# Patient Record
Sex: FEMALE | Race: BLACK OR AFRICAN AMERICAN | NOT HISPANIC OR LATINO | Employment: OTHER | URBAN - METROPOLITAN AREA
[De-identification: names, ages, dates, MRNs, and addresses within clinical notes are randomized per-mention and may not be internally consistent; named-entity substitution may affect disease eponyms.]

---

## 2017-12-14 PROBLEM — M79.645 THUMB PAIN, LEFT: Status: ACTIVE | Noted: 2017-12-14

## 2022-12-06 PROBLEM — R29.818 PROGRESSIVE NEUROLOGIC DECLINE: Status: ACTIVE | Noted: 2021-11-28

## 2022-12-06 PROBLEM — R26.9 ABNORMALITY OF GAIT AND MOBILITY: Status: ACTIVE | Noted: 2022-12-06

## 2023-12-07 PROCEDURE — G0180 MD CERTIFICATION HHA PATIENT: HCPCS | Mod: ,,, | Performed by: INTERNAL MEDICINE

## 2024-01-02 ENCOUNTER — OUTPATIENT CASE MANAGEMENT (OUTPATIENT)
Dept: ADMINISTRATIVE | Facility: OTHER | Age: 84
End: 2024-01-02
Payer: MEDICARE
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
## 2024-01-05 ENCOUNTER — CLINICAL SUPPORT (OUTPATIENT)
Dept: FAMILY MEDICINE | Facility: CLINIC | Age: 84
End: 2024-01-05
Payer: MEDICARE

## 2024-01-05 VITALS — WEIGHT: 194.25 LBS | BODY MASS INDEX: 32.32 KG/M2

## 2024-01-05 DIAGNOSIS — Z23 NEED FOR INFLUENZA VACCINATION: Primary | ICD-10-CM

## 2024-01-05 PROCEDURE — G0008 ADMIN INFLUENZA VIRUS VAC: HCPCS | Mod: HCNC,S$GLB,, | Performed by: INTERNAL MEDICINE

## 2024-01-05 PROCEDURE — 90694 VACC AIIV4 NO PRSRV 0.5ML IM: CPT | Mod: HCNC,S$GLB,, | Performed by: INTERNAL MEDICINE

## 2024-01-11 ENCOUNTER — OFFICE VISIT (OUTPATIENT)
Dept: PSYCHIATRY | Facility: CLINIC | Age: 84
End: 2024-01-11
Payer: MEDICARE

## 2024-01-11 VITALS
HEART RATE: 68 BPM | WEIGHT: 196.19 LBS | DIASTOLIC BLOOD PRESSURE: 75 MMHG | OXYGEN SATURATION: 97 % | BODY MASS INDEX: 32.65 KG/M2 | SYSTOLIC BLOOD PRESSURE: 117 MMHG

## 2024-01-11 DIAGNOSIS — F41.1 GAD (GENERALIZED ANXIETY DISORDER): ICD-10-CM

## 2024-01-11 DIAGNOSIS — F33.0 MDD (MAJOR DEPRESSIVE DISORDER), RECURRENT EPISODE, MILD: Primary | ICD-10-CM

## 2024-01-11 DIAGNOSIS — I69.911 MEMORY DEFICIT AFTER CEREBROVASCULAR DISEASE: ICD-10-CM

## 2024-01-11 PROCEDURE — 99999 PR PBB SHADOW E&M-EST. PATIENT-LVL IV: CPT | Mod: PBBFAC,HCNC,,

## 2024-01-11 PROCEDURE — 99215 OFFICE O/P EST HI 40 MIN: CPT | Mod: HCNC,S$GLB,,

## 2024-01-11 PROCEDURE — 3078F DIAST BP <80 MM HG: CPT | Mod: HCNC,CPTII,S$GLB,

## 2024-01-11 PROCEDURE — 3074F SYST BP LT 130 MM HG: CPT | Mod: HCNC,CPTII,S$GLB,

## 2024-01-11 PROCEDURE — 1159F MED LIST DOCD IN RCRD: CPT | Mod: HCNC,CPTII,S$GLB,

## 2024-01-11 PROCEDURE — 1160F RVW MEDS BY RX/DR IN RCRD: CPT | Mod: HCNC,CPTII,S$GLB,

## 2024-01-11 RX ORDER — TRAZODONE HYDROCHLORIDE 150 MG/1
150 TABLET ORAL NIGHTLY
Qty: 90 TABLET | Refills: 1 | Status: SHIPPED | OUTPATIENT
Start: 2024-01-11 | End: 2024-07-09

## 2024-01-11 RX ORDER — SERTRALINE HYDROCHLORIDE 100 MG/1
100 TABLET, FILM COATED ORAL DAILY
Qty: 90 TABLET | Refills: 1 | Status: SHIPPED | OUTPATIENT
Start: 2024-01-11 | End: 2024-07-09

## 2024-01-11 RX ORDER — RISPERIDONE 0.5 MG/1
0.5 TABLET ORAL NIGHTLY
Qty: 90 TABLET | Refills: 1 | Status: SHIPPED | OUTPATIENT
Start: 2024-01-11 | End: 2024-07-09
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
## 2024-01-18 ENCOUNTER — HOSPITAL ENCOUNTER (OUTPATIENT)
Dept: CARDIOLOGY | Facility: HOSPITAL | Age: 84
Discharge: HOME | End: 2024-01-18
Attending: INTERNAL MEDICINE
Payer: MEDICARE

## 2024-01-18 ENCOUNTER — HOSPITAL ENCOUNTER (OUTPATIENT)
Dept: RADIOLOGY | Facility: HOSPITAL | Age: 84
Discharge: HOME | End: 2024-01-18
Attending: INTERNAL MEDICINE
Payer: MEDICARE

## 2024-01-18 DIAGNOSIS — R07.9 CHEST PAIN, UNSPECIFIED TYPE: ICD-10-CM

## 2024-01-18 LAB
AORTIC ROOT ANNULUS: 2.75 CM
AORTIC VALVE CUSP SEPERATION: 2.04 CM
ASCENDING AORTA: 2.88 CM
AV INDEX (PROSTH): 0.86
AV MEAN GRADIENT: 5 MMHG
AV PEAK GRADIENT: 9 MMHG
AV VALVE AREA BY VELOCITY RATIO: 2.25 CM²
AV VALVE AREA: 2.41 CM²
AV VELOCITY RATIO: 0.8
CV ECHO LV RWT: 0.72 CM
CV STRESS BASE HR: 61 BPM
DIASTOLIC BLOOD PRESSURE: 68 MMHG
DOP CALC AO PEAK VEL: 1.46 M/S
DOP CALC AO VTI: 34.7 CM
DOP CALC LVOT AREA: 2.8 CM2
DOP CALC LVOT DIAMETER: 1.89 CM
DOP CALC LVOT PEAK VEL: 1.17 M/S
DOP CALC LVOT STROKE VOLUME: 83.56 CM3
DOP CALCLVOT PEAK VEL VTI: 29.8 CM
E WAVE DECELERATION TIME: 289.57 MSEC
E/A RATIO: 0.73
E/E' RATIO: 11.43 M/S
ECHO LV POSTERIOR WALL: 1.33 CM (ref 0.6–1.1)
FRACTIONAL SHORTENING: 37 % (ref 28–44)
INTERVENTRICULAR SEPTUM: 1.35 CM (ref 0.6–1.1)
IVC DIAMETER: 1.48 CM
IVRT: 114.18 MSEC
LA MAJOR: 5.53 CM
LA MINOR: 5.24 CM
LA WIDTH: 4.8 CM
LEFT ATRIUM SIZE: 3.58 CM
LEFT ATRIUM VOLUME: 78.6 CM3
LEFT INTERNAL DIMENSION IN SYSTOLE: 2.33 CM (ref 2.1–4)
LEFT VENTRICLE DIASTOLIC VOLUME: 56.85 ML
LEFT VENTRICLE SYSTOLIC VOLUME: 18.67 ML
LEFT VENTRICULAR INTERNAL DIMENSION IN DIASTOLE: 3.67 CM (ref 3.5–6)
LEFT VENTRICULAR MASS: 172.51 G
LV LATERAL E/E' RATIO: 10 M/S
LV SEPTAL E/E' RATIO: 13.33 M/S
LVOT MG: 3.12 MMHG
LVOT MV: 0.83 CM/S
MV PEAK A VEL: 1.1 M/S
MV PEAK E VEL: 0.8 M/S
MV STENOSIS PRESSURE HALF TIME: 83.97 MS
MV VALVE AREA P 1/2 METHOD: 2.62 CM2
NUC REST EJECTION FRACTION: 78
OHS CV CPX 85 PERCENT MAX PREDICTED HEART RATE MALE: 116
OHS CV CPX MAX PREDICTED HEART RATE: 137
OHS CV CPX PATIENT IS FEMALE: 1
OHS CV CPX PATIENT IS MALE: 0
OHS CV CPX PEAK DIASTOLIC BLOOD PRESSURE: 63 MMHG
OHS CV CPX PEAK HEAR RATE: 80 BPM
OHS CV CPX PEAK RATE PRESSURE PRODUCT: 8560
OHS CV CPX PEAK SYSTOLIC BLOOD PRESSURE: 107 MMHG
OHS CV CPX PERCENT MAX PREDICTED HEART RATE ACHIEVED: 60
OHS CV CPX RATE PRESSURE PRODUCT PRESENTING: 8845
PISA TR MAX VEL: 2.87 M/S
PULM VEIN S/D RATIO: 1.73
PV PEAK D VEL: 0.4 M/S
PV PEAK GRADIENT: 4 MMHG
PV PEAK S VEL: 0.69 M/S
PV PEAK VELOCITY: 0.96 M/S
RA MAJOR: 5.53 CM
RA PRESSURE ESTIMATED: 3 MMHG
RA WIDTH: 4.2 CM
RIGHT VENTRICULAR END-DIASTOLIC DIMENSION: 3.92 CM
RV TB RVSP: 6 MMHG
RV TISSUE DOPPLER FREE WALL SYSTOLIC VELOCITY 1 (APICAL 4 CHAMBER VIEW): 15.55 CM/S
SINUS: 2.99 CM
STJ: 2.58 CM
SYSTOLIC BLOOD PRESSURE: 145 MMHG
TDI LATERAL: 0.08 M/S
TDI SEPTAL: 0.06 M/S
TDI: 0.07 M/S
TR MAX PG: 33 MMHG
TRICUSPID ANNULAR PLANE SYSTOLIC EXCURSION: 2.39 CM
TV REST PULMONARY ARTERY PRESSURE: 36 MMHG

## 2024-01-18 PROCEDURE — 93018 CV STRESS TEST I&R ONLY: CPT | Mod: HCNC,,, | Performed by: INTERNAL MEDICINE

## 2024-01-18 PROCEDURE — 78452 HT MUSCLE IMAGE SPECT MULT: CPT | Mod: 26,HCNC,, | Performed by: INTERNAL MEDICINE

## 2024-01-18 PROCEDURE — 93306 TTE W/DOPPLER COMPLETE: CPT | Mod: HCNC

## 2024-01-18 PROCEDURE — 93016 CV STRESS TEST SUPVJ ONLY: CPT | Mod: HCNC,,, | Performed by: INTERNAL MEDICINE

## 2024-01-18 PROCEDURE — 93017 CV STRESS TEST TRACING ONLY: CPT | Mod: HCNC

## 2024-01-18 PROCEDURE — 93306 TTE W/DOPPLER COMPLETE: CPT | Mod: 26,HCNC,, | Performed by: INTERNAL MEDICINE

## 2024-01-18 PROCEDURE — 63600175 PHARM REV CODE 636 W HCPCS: Mod: HCNC | Performed by: INTERNAL MEDICINE

## 2024-01-18 RX ORDER — REGADENOSON 0.08 MG/ML
0.4 INJECTION, SOLUTION INTRAVENOUS ONCE
Status: COMPLETED | OUTPATIENT
Start: 2024-01-18 | End: 2024-01-18

## 2024-01-18 RX ADMIN — REGADENOSON 0.4 MG: 0.08 INJECTION, SOLUTION INTRAVENOUS at 09:01

## 2024-01-19 ENCOUNTER — TELEPHONE (OUTPATIENT)
Dept: FAMILY MEDICINE | Facility: CLINIC | Age: 84
End: 2024-01-19
Payer: MEDICARE

## 2024-01-19 NOTE — TELEPHONE ENCOUNTER
Started retaining fluid since flu shot on 1/5 , declined soonest opening at [facility] 1/22 . Recommended urgent care

## 2024-01-19 NOTE — TELEPHONE ENCOUNTER
----- Message from Yaneth Neville sent at 1/19/2024 11:35 AM CST -----  .Type: Patient Call Back    Who called: Self     What is the request in detail: Stated she has fluid in her hands and legs. Having pain in her side     Can the clinic reply by [facility]? No     Would the patient rather a call back or a response via [facility]? Call Back     Best call back number: .[phone] (home)        Additional Information:

## 2024-01-23 ENCOUNTER — OFFICE VISIT (OUTPATIENT)
Dept: CARDIOLOGY | Facility: CLINIC | Age: 84
End: 2024-01-23
Payer: MEDICARE

## 2024-01-23 VITALS
BODY MASS INDEX: 31.39 KG/M2 | OXYGEN SATURATION: 97 % | HEART RATE: 64 BPM | RESPIRATION RATE: 15 BRPM | HEIGHT: 66 IN | WEIGHT: 195.31 LBS | DIASTOLIC BLOOD PRESSURE: 72 MMHG | SYSTOLIC BLOOD PRESSURE: 142 MMHG

## 2024-01-23 DIAGNOSIS — I25.118 CORONARY ARTERY DISEASE OF NATIVE ARTERY OF NATIVE HEART WITH STABLE ANGINA PECTORIS: ICD-10-CM

## 2024-01-23 DIAGNOSIS — I70.0 AORTIC ATHEROSCLEROSIS: ICD-10-CM

## 2024-01-23 PROCEDURE — 99999 PR PBB SHADOW E&M-EST. PATIENT-LVL V: CPT | Mod: PBBFAC,HCNC,, | Performed by: INTERNAL MEDICINE

## 2024-01-23 PROCEDURE — 1125F AMNT PAIN NOTED PAIN PRSNT: CPT | Mod: HCNC,CPTII,S$GLB, | Performed by: INTERNAL MEDICINE

## 2024-01-23 PROCEDURE — 3078F DIAST BP <80 MM HG: CPT | Mod: HCNC,CPTII,S$GLB, | Performed by: INTERNAL MEDICINE

## 2024-01-23 PROCEDURE — 3288F FALL RISK ASSESSMENT DOCD: CPT | Mod: HCNC,CPTII,S$GLB, | Performed by: INTERNAL MEDICINE

## 2024-01-23 PROCEDURE — 3077F SYST BP >= 140 MM HG: CPT | Mod: HCNC,CPTII,S$GLB, | Performed by: INTERNAL MEDICINE

## 2024-01-23 PROCEDURE — 1159F MED LIST DOCD IN RCRD: CPT | Mod: HCNC,CPTII,S$GLB, | Performed by: INTERNAL MEDICINE

## 2024-01-23 PROCEDURE — 1101F PT FALLS ASSESS-DOCD LE1/YR: CPT | Mod: HCNC,CPTII,S$GLB, | Performed by: INTERNAL MEDICINE

## 2024-01-23 PROCEDURE — 99214 OFFICE O/P EST MOD 30 MIN: CPT | Mod: HCNC,S$GLB,, | Performed by: INTERNAL MEDICINE

## 2024-01-23 RX ORDER — FUROSEMIDE 20 MG/1
20 TABLET ORAL
Qty: 90 TABLET | Refills: 3 | Status: SHIPPED | OUTPATIENT
Start: 2024-01-23

## 2024-01-23 RX ORDER — NITROGLYCERIN 0.4 MG/1
0.4 TABLET SUBLINGUAL EVERY 5 MIN PRN
Qty: 30 TABLET | Refills: 12 | Status: SHIPPED | OUTPATIENT
Start: 2024-01-23 | End: 2024-02-22

## 2024-01-23 NOTE — PROGRESS NOTES
CARDIOLOGY CLINIC VISIT        HISTORY OF PRESENT ILLNESS:     Luann Baltazar presents for continued care. Last seen by Dr. Shelby. Hx of CAD, prior PCI. Unknown vessel. Patient states symptoms prior included sob. She has noted increasing soboe over the past few months. Cannot walk > one block before symptoms start. Associated left sided chest pain that lasts only a few seconds. C/o ankle edema. States that she is not taking the amlodipine. She also has been dealing with grief/loss. Step son  from COVID. Grandson was killed last year. Used to take doxepin. Denies SI/HI.    Notes from :  Patient here for follow-up.  Did not get her stress test done.  She says she gets chest tightness about once a month.  Denies any orthopnea, PND, swelling of feet    :  Patient here for results.  Doing fine.  Stress test did not show any significant ischemia.  Echo showed normal left ventricle systolic function    Results for orders placed during the hospital encounter of 24    Nuclear Stress - Cardiology Interpreted    Interpretation Summary    Normal myocardial perfusion scan. There is no evidence of myocardial ischemia or infarction.    The gated perfusion images showed an ejection fraction of 78% at rest.    There is normal wall motion at rest and post stress.    The ECG portion of the study is negative for ischemia.    The patient reported no chest pain during the stress test.    There were no arrhythmias during stress.      Results for orders placed during the hospital encounter of 24    Echo    Interpretation Summary    Left Ventricle: The left ventricle is normal in size. Mildly increased wall thickness. Normal wall motion. There is normal systolic function with a visually estimated ejection fraction of 60 - 65%. Grade I diastolic dysfunction.    Right Ventricle: Normal right ventricular cavity size. Systolic function is normal.    Left Atrium: Left atrium is moderately dilated.    Right Atrium:  Right atrium is mildly dilated.    Aortic Valve: There is mild aortic valve sclerosis.    Mitral Valve: There is mild regurgitation.    Pulmonary Artery: The estimated pulmonary artery systolic pressure is 36 mmHg.    IVC/SVC: Normal venous pressure at 3 mmHg.        CARDIOVASCULAR HISTORY:     CAD    PAST MEDICAL HISTORY:     Past Medical History:   Diagnosis Date    Angina pectoris     Combined forms of age-related cataract of left eye 8/18/2023    Coronary artery disease     Diabetes mellitus     High cholesterol     Hypertension     Hypothyroidism     Obesity     Recurrent depression 9/18/2023    Thyroid disease     Trouble in sleeping     Type 2 diabetes mellitus        PAST SURGICAL HISTORY:     Past Surgical History:   Procedure Laterality Date    BRAIN SURGERY      CARDIAC SURGERY      stents    CORONARY STENT PLACEMENT         ALLERGIES AND MEDICATION:     Review of patient's allergies indicates:   Allergen Reactions    Codeine Nausea And Vomiting    Iodinated contrast media Itching    Penicillins Itching    Talwin [pentazocine lactate] Nausea And Vomiting        Medication List            Accurate as of January 23, 2024  1:51 PM. If you have any questions, ask your nurse or doctor.                CONTINUE taking these medications      ACCU-CHEK SOFTCLIX LANCETS Misc  Generic drug: lancets  TEST EVERY DAY AS NEEDED     acetaminophen 500 MG tablet  Commonly known as: TYLENOL  Take 2 tablets (1,000 mg total) by mouth 2 (two) times daily as needed for Pain.     alcohol swabs Padm  Apply 1 each topically daily as needed.     amLODIPine 10 MG tablet  Commonly known as: NORVASC  Take 1 tablet (10 mg total) by mouth once daily.     ammonium lactate 12 % Crea  Apply 2 g topically once daily.     aspirin 81 MG EC tablet  Commonly known as: ECOTRIN     atorvastatin 40 MG tablet  Commonly known as: LIPITOR  Take 1 tablet (40 mg total) by mouth once daily.     atropine 1% 1 % Drop  Commonly known as: ISOPTO ATROPINE      blood-glucose meter kit  Commonly known as: TRUE METRIX AIR GLUCOSE METER  Use as directed, to use with insurance preferred meter     ciprofloxacin HCl 0.3 % ophthalmic solution  Commonly known as: CILOXAN     desmopressin 10 mcg/spray (0.1 mL) Sprp  Commonly known as: DDAVP NASAL  INHALE 1 SPRAY IN EACH NOSTRIL ONCE DAILY.     diaper,brief,adult,disposable Misc     diclofenac sodium 1 % Gel  Commonly known as: VOLTAREN  Apply 2 g topically 2 (two) times daily.     doxepin 50 MG capsule  Commonly known as: SINEQUAN     ergocalciferol 50,000 unit Cap  Commonly known as: VITAMIN D2  Take 1 capsule (50,000 Units total) by mouth every 7 days.     fluticasone propionate 50 mcg/actuation nasal spray  Commonly known as: FLONASE  INHALE 2 SPRAYS IN EACH NOSTRIL ONCE DAILY FOR ALLERGIES or FOR SINUS SYMPTOMS.     furosemide 40 MG tablet  Commonly known as: LASIX  Take 1 tablet (40 mg total) by mouth 2 (two) times daily. Fluid pill for leg swelling and blood pressure     ipratropium 21 mcg (0.03 %) nasal spray  Commonly known as: ATROVENT  2 sprays by Each Nostril route 2 (two) times daily.     ketorolac 0.5% 0.5 % Drop  Commonly known as: ACULAR     levothyroxine 75 MCG tablet  Commonly known as: SYNTHROID  TAKE ONE TABLET BY MOUTH EVERY MORNING ONE HOUR BEFORE BREAKFAST & before other medications FOR THYROID     metoprolol succinate 50 MG 24 hr tablet  Commonly known as: TOPROL-XL  Take 1 tablet (50 mg total) by mouth once daily.     nitroGLYCERIN 6.5 MG Cpsr  Commonly known as: NITRO-TIME  Take 1 capsule (6.5 mg total) by mouth 2 (two) times daily.     prednisoLONE acetate 1 % Drps  Commonly known as: PRED FORTE     pregabalin 50 MG capsule  Commonly known as: LYRICA  Take 1 capsule (50 mg total) by mouth every evening. For nerve pain     risperiDONE 0.5 MG Tab  Commonly known as: RisperDAL  Take 1 tablet (0.5 mg total) by mouth nightly.     sertraline 100 MG tablet  Commonly known as: ZOLOFT  Take 1 tablet (100 mg  total) by mouth once daily.     traZODone 150 MG tablet  Commonly known as: DESYREL  Take 1 tablet (150 mg total) by mouth every evening.              SOCIAL HISTORY:     Social History     Socioeconomic History    Marital status:    Tobacco Use    Smoking status: Former     Types: Cigarettes    Smokeless tobacco: Never   Substance and Sexual Activity    Alcohol use: No    Drug use: No     Social Determinants of Health     Financial Resource Strain: Low Risk  (9/14/2023)    Overall Financial Resource Strain (CARDIA)     Difficulty of Paying Living Expenses: Not hard at all   Food Insecurity: No Food Insecurity (9/14/2023)    Hunger Vital Sign     Worried About Running Out of Food in the Last Year: Never true     Ran Out of Food in the Last Year: Never true   Transportation Needs: No Transportation Needs (9/14/2023)    PRAPARE - Transportation     Lack of Transportation (Medical): No     Lack of Transportation (Non-Medical): No   Physical Activity: Inactive (9/14/2023)    Exercise Vital Sign     Days of Exercise per Week: 0 days     Minutes of Exercise per Session: 0 min   Stress: No Stress Concern Present (9/14/2023)    [employer] [place] [employer] - Occupational Stress Questionnaire     Feeling of Stress : Only a little   Social Connections: Socially Integrated (9/14/2023)    Social Connection and Isolation Panel [NHANES]     Frequency of Communication with Friends and Family: More than three times a week     Frequency of Social Gatherings with Friends and Family: More than three times a week     Attends [employer] Services: More than 4 times per year     Active Member of Clubs or Organizations: Yes     Attends Club or Organization Meetings: Never     Marital Status:    Housing Stability: Low Risk  (9/14/2023)    Housing Stability Vital Sign     Unable to Pay for Housing in the Last Year: No     Number of Places Lived in the Last Year: 1     Unstable Housing in the Last Year: No  "      FAMILY HISTORY:     Family History   Problem Relation Age of Onset    Stroke Mother     Hypertension Mother     Cancer Mother     Heart disease Father     Cancer Brother     Glaucoma Sister     No Known Problems Maternal Aunt     No Known Problems Maternal Uncle     No Known Problems Paternal Aunt     No Known Problems Paternal Uncle     No Known Problems Maternal Grandmother     No Known Problems Maternal Grandfather     No Known Problems Paternal Grandmother     No Known Problems Paternal Grandfather     Amblyopia Neg Hx     Blindness Neg Hx     Cataracts Neg Hx     Diabetes Neg Hx     Macular degeneration Neg Hx     Retinal detachment Neg Hx     Strabismus Neg Hx     Thyroid disease Neg Hx        REVIEW OF SYSTEMS:   Review of Systems   Constitutional:  Positive for malaise/fatigue. Negative for diaphoresis and weight loss.   Respiratory:  Positive for shortness of breath. Negative for cough, sputum production and wheezing.    Cardiovascular:  Positive for chest pain and leg swelling. Negative for palpitations, orthopnea, claudication and PND.   Gastrointestinal:  Negative for nausea and vomiting.   Neurological:  Negative for dizziness, sensory change, speech change, focal weakness, seizures, loss of consciousness, weakness and headaches.   Psychiatric/Behavioral:  Positive for depression. Negative for hallucinations and suicidal ideas. The patient is nervous/anxious and has insomnia.        PHYSICAL EXAM:     Vitals:    01/23/24 1340   BP: (!) 142/72   Pulse: 64   Resp: 15    Body mass index is 32.01 kg/m².  Weight: 88.6 kg (195 lb 5.2 oz)   Height: 5' 5.5" (166.4 cm)     Physical Exam  Vitals reviewed.   Constitutional:       General: She is not in acute distress.     Appearance: She is not diaphoretic.   HENT:      Head: Normocephalic.   Neck:      Vascular: No carotid bruit or JVD.   Cardiovascular:      Rate and Rhythm: Normal rate and regular rhythm.      Heart sounds: Murmur heard.      Systolic " murmur is present with a grade of 2/6.   Pulmonary:      Effort: Pulmonary effort is normal.      Breath sounds: Normal breath sounds.   Abdominal:      General: Bowel sounds are normal.      Palpations: Abdomen is soft.      Tenderness: There is no abdominal tenderness.   Musculoskeletal:      Right ankle: Swelling present.      Left ankle: Swelling present.   Skin:     General: Skin is warm and dry.   Neurological:      Mental Status: She is alert and oriented to person, place, and time.   Psychiatric:         Speech: Speech normal.         Behavior: Behavior normal.         Thought Content: Thought content normal.       DATA:   EKG: (personally reviewed tracing)  6/2/20 - NSR  Laboratory:  CBC:  Recent Labs   Lab 06/24/22  1702 12/19/22  1318 08/11/23  1150   WBC 5.38 5.23 6.70   Hemoglobin 12.5 12.3 13.8   Hematocrit 36.8 L 36.5 L 43.9   Platelets 158 230 230         CHEMISTRIES:  Recent Labs   Lab 11/28/21  1550 11/29/21 0408 06/24/22 1702 12/19/22  1318 08/11/23  1150 08/31/23  0853 11/14/23  1027   Glucose 94 107 96   < > 144 H 134 H 120 H   Sodium 135 L 135 L 127 L   < > 143 140 145   Potassium 3.9 4.5 4.8   < > 3.3 L 3.8 4.4   BUN 8 7 L 6 L   < > 10 7 L 9   Creatinine 0.7 0.6 0.7   < > 0.8 0.8 0.8   eGFR if African American >60 >60 >60  --   --   --   --    eGFR if non African American >60 >60 >60  --   --   --   --    Calcium 9.0 9.1 9.1   < > 9.6 9.0 9.6   Magnesium 1.9 2.0  --   --   --   --   --     < > = values in this interval not displayed.         CARDIAC BIOMARKERS:  Recent Labs   Lab 11/28/21  1550 11/29/21  0408    80   CPK MB  --  1.4   Troponin I 0.017 0.009         COAGS:  Recent Labs   Lab 11/29/21  0408   INR 0.9         LIPIDS/LFTS:  Recent Labs   Lab 11/28/21  2034 11/29/21  0408 12/19/22  1318 08/11/23  1150 12/21/23  1221   Cholesterol 187  --  264 H  --  155   Triglycerides 204 H  --  139  --  99   HDL 26 L  --  47  --  37 L   LDL Cholesterol 120.2  --  189.2 H  --  98.2    Non-HDL Cholesterol 161  --  217  --  118   AST  --  20 26 21  --    ALT  --  15 18 12  --          Cardiovascular Testing:    echo: 11-17  CONCLUSIONS     1 - Normal left ventricular systolic function (EF 55-60%).     2 - Concentric remodeling.      NST:  Impression: NORMAL MYOCARDIAL PERFUSION  1. The perfusion scan is free of evidence for myocardial ischemia or injury.   2. Resting wall motion is physiologic.   3. Resting LV function is normal.   4. The ventricular volumes are normal at rest and stress.   5. The extracardiac distribution of radioactivity is normal.   6. When compared to the previous study from 10/06/2014, no significant change.    ASSESSMENT:     1. SOBOE.  2. Chest pain  3. CAD  4. HTN  5. HLP: LDL 99  6. Depression/grief/loss    PLAN:     No orders of the defined types were placed in this encounter.      Stress test did not show any significant ischemia.  Echo showed normal left ventricle systolic function.    Follow-up 6 months with Dr. Luann Novak MD, [facility], [facility]  Interventional Cardiologist  [facility] ([facility])

## 2024-02-01 ENCOUNTER — EXTERNAL HOME HEALTH (OUTPATIENT)
Dept: HOME HEALTH SERVICES | Facility: HOSPITAL | Age: 84
End: 2024-02-01
Payer: MEDICARE

## 2024-02-21 DIAGNOSIS — E03.9 ACQUIRED HYPOTHYROIDISM: ICD-10-CM

## 2024-02-21 RX ORDER — LEVOTHYROXINE SODIUM 75 UG/1
TABLET ORAL
Qty: 90 TABLET | Refills: 1 | Status: SHIPPED | OUTPATIENT
Start: 2024-02-21

## 2024-02-21 NOTE — TELEPHONE ENCOUNTER
Care Due:                  Date            Visit Type   Department     Provider  --------------------------------------------------------------------------------                                [facility]                              PRIMARY      MEDICINE/  Last Visit: 12-      CARE ([facility])   INTERNAL MED   Sherif Steen                              [facility]                              PRIMARY      MEDICINE/  Next Visit: 03-      CARE ([facility])   INTERNAL MED   Sherif Steen                                                            Last  Test          Frequency    Reason                     Performed    Due Date  --------------------------------------------------------------------------------    Vitamin D...  12 months..  ergocalciferol...........  Not Found    Overdue    [employer] Embedded Care Due Messages. Reference number: [id number].   2/21/2024 2:29:42 PM CST

## 2024-02-21 NOTE — TELEPHONE ENCOUNTER
Provider Staff:  Action required for this patient    Requires labs      Please see care gap opportunities below in Care Due Message.    Thanks!  [facility]     Appointments      Date Provider   Last Visit   12/5/2023 Sherif Steen MD   Next Visit   3/14/2024 Sherif Steen MD     Refill Decision Note   Luann Whitaker  is requesting a refill authorization.    Brief Assessment and Rationale for Refill:   Approve       Medication Therapy Plan:         Comments:     Note composed:4:38 PM 02/21/2024

## 2024-03-14 ENCOUNTER — LAB VISIT (OUTPATIENT)
Dept: LAB | Facility: HOSPITAL | Age: 84
End: 2024-03-14
Attending: INTERNAL MEDICINE
Payer: MEDICARE

## 2024-03-14 ENCOUNTER — OFFICE VISIT (OUTPATIENT)
Dept: FAMILY MEDICINE | Facility: CLINIC | Age: 84
End: 2024-03-14
Payer: MEDICARE

## 2024-03-14 VITALS
WEIGHT: 197.75 LBS | OXYGEN SATURATION: 95 % | DIASTOLIC BLOOD PRESSURE: 72 MMHG | SYSTOLIC BLOOD PRESSURE: 124 MMHG | TEMPERATURE: 98 F | BODY MASS INDEX: 31.78 KG/M2 | HEIGHT: 66 IN | HEART RATE: 71 BPM

## 2024-03-14 DIAGNOSIS — J30.89 NON-SEASONAL ALLERGIC RHINITIS DUE TO OTHER ALLERGIC TRIGGER: ICD-10-CM

## 2024-03-14 DIAGNOSIS — E03.9 HYPOTHYROIDISM, UNSPECIFIED TYPE: ICD-10-CM

## 2024-03-14 DIAGNOSIS — R73.01 IMPAIRED FASTING GLUCOSE: ICD-10-CM

## 2024-03-14 DIAGNOSIS — E78.2 MIXED HYPERLIPIDEMIA: ICD-10-CM

## 2024-03-14 DIAGNOSIS — I10 ESSENTIAL HYPERTENSION: ICD-10-CM

## 2024-03-14 DIAGNOSIS — I10 ESSENTIAL HYPERTENSION: Primary | ICD-10-CM

## 2024-03-14 LAB
ANION GAP SERPL CALC-SCNC: 13 MMOL/L (ref 8–16)
BUN SERPL-MCNC: 9 MG/DL (ref 8–23)
CALCIUM SERPL-MCNC: 9.4 MG/DL (ref 8.7–10.5)
CHLORIDE SERPL-SCNC: 104 MMOL/L (ref 95–110)
CO2 SERPL-SCNC: 26 MMOL/L (ref 23–29)
CREAT SERPL-MCNC: 0.8 MG/DL (ref 0.5–1.4)
EST. GFR  (NO RACE VARIABLE): >60 ML/MIN/1.73 M^2
GLUCOSE SERPL-MCNC: 146 MG/DL (ref 70–110)
POTASSIUM SERPL-SCNC: 4.2 MMOL/L (ref 3.5–5.1)
SODIUM SERPL-SCNC: 143 MMOL/L (ref 136–145)
TSH SERPL DL<=0.005 MIU/L-ACNC: 0.7 UIU/ML (ref 0.4–4)

## 2024-03-14 PROCEDURE — 80048 BASIC METABOLIC PNL TOTAL CA: CPT | Mod: HCNC | Performed by: INTERNAL MEDICINE

## 2024-03-14 PROCEDURE — 3288F FALL RISK ASSESSMENT DOCD: CPT | Mod: HCNC,CPTII,S$GLB, | Performed by: INTERNAL MEDICINE

## 2024-03-14 PROCEDURE — 3074F SYST BP LT 130 MM HG: CPT | Mod: HCNC,CPTII,S$GLB, | Performed by: INTERNAL MEDICINE

## 2024-03-14 PROCEDURE — 3078F DIAST BP <80 MM HG: CPT | Mod: HCNC,CPTII,S$GLB, | Performed by: INTERNAL MEDICINE

## 2024-03-14 PROCEDURE — 1159F MED LIST DOCD IN RCRD: CPT | Mod: HCNC,CPTII,S$GLB, | Performed by: INTERNAL MEDICINE

## 2024-03-14 PROCEDURE — 36415 COLL VENOUS BLD VENIPUNCTURE: CPT | Mod: HCNC,PN | Performed by: INTERNAL MEDICINE

## 2024-03-14 PROCEDURE — 1101F PT FALLS ASSESS-DOCD LE1/YR: CPT | Mod: HCNC,CPTII,S$GLB, | Performed by: INTERNAL MEDICINE

## 2024-03-14 PROCEDURE — 99213 OFFICE O/P EST LOW 20 MIN: CPT | Mod: HCNC,S$GLB,, | Performed by: INTERNAL MEDICINE

## 2024-03-14 PROCEDURE — 1160F RVW MEDS BY RX/DR IN RCRD: CPT | Mod: HCNC,CPTII,S$GLB, | Performed by: INTERNAL MEDICINE

## 2024-03-14 PROCEDURE — 99999 PR PBB SHADOW E&M-EST. PATIENT-LVL V: CPT | Mod: PBBFAC,HCNC,, | Performed by: INTERNAL MEDICINE

## 2024-03-14 PROCEDURE — 83036 HEMOGLOBIN GLYCOSYLATED A1C: CPT | Mod: HCNC | Performed by: INTERNAL MEDICINE

## 2024-03-14 PROCEDURE — 1126F AMNT PAIN NOTED NONE PRSNT: CPT | Mod: HCNC,CPTII,S$GLB, | Performed by: INTERNAL MEDICINE

## 2024-03-14 PROCEDURE — 84443 ASSAY THYROID STIM HORMONE: CPT | Mod: HCNC | Performed by: INTERNAL MEDICINE

## 2024-03-14 RX ORDER — FLUTICASONE PROPIONATE 50 MCG
1 SPRAY, SUSPENSION (ML) NASAL 2 TIMES DAILY
Qty: 48 G | Refills: 3 | Status: SHIPPED | OUTPATIENT
Start: 2024-03-14

## 2024-03-14 NOTE — PROGRESS NOTES
"Subjective:       Patient ID: Luann Whitaker is a 83 y.o. female.    Chief Complaint: Follow-up (4m f/u )    F/u chronic conditions/ discuss allergies    HPI: 84 y/o w/ HTN MDD presents with daughter for scheduled follow up reports over last two weeks sneezing more with nasal congestion no change in breathing no vision changes no LE swelling together with patient and daughter reviewed her home medication list sleep improved since starting risperidone no longer using diazepam       Review of Systems   Constitutional:  Negative for activity change, appetite change, fatigue, fever and unexpected weight change.   HENT:  Positive for rhinorrhea and sneezing. Negative for ear pain and sore throat.    Eyes:  Negative for discharge and visual disturbance.   Respiratory:  Negative for chest tightness, shortness of breath and wheezing.    Cardiovascular:  Negative for chest pain, palpitations and leg swelling.   Gastrointestinal:  Negative for abdominal pain, constipation and diarrhea.   Endocrine: Negative for cold intolerance and heat intolerance.   Genitourinary:  Negative for dysuria and hematuria.   Musculoskeletal:  Negative for joint swelling and neck stiffness.   Skin:  Negative for rash.   Neurological:  Negative for dizziness, syncope, weakness and headaches.   Psychiatric/Behavioral:  Negative for suicidal ideas.        Objective:     Vitals:    03/14/24 0951   BP: 124/72   BP Location: Left arm   Patient Position: Sitting   BP Method: Large (Manual)   Pulse: 71   Temp: 98.1 °F (36.7 °C)   TempSrc: Oral   SpO2: 95%   Weight: 89.7 kg (197 lb 12 oz)   Height: 5' 5.5" (1.664 m)          Physical Exam  Constitutional:       General: She is not in acute distress.  HENT:      Head: Normocephalic and atraumatic.      Right Ear: Tympanic membrane normal.      Left Ear: Tympanic membrane normal.      Nose: Rhinorrhea present.      Mouth/Throat:      Pharynx: Posterior oropharyngeal erythema present. No oropharyngeal " exudate.   Cardiovascular:      Rate and Rhythm: Normal rate and regular rhythm.      Pulses: Normal pulses.      Heart sounds: Normal heart sounds. No murmur heard.     No gallop.   Pulmonary:      Effort: Pulmonary effort is normal. No respiratory distress.      Breath sounds: Normal breath sounds. No wheezing.   Musculoskeletal:      Right lower leg: No edema.      Left lower leg: No edema.   Neurological:      Mental Status: She is alert.         Assessment and Plan   1. Essential hypertension  Bp at goal labs to monitor renal function and electorlytes  - Basic Metabolic Panel; Future    2. Impaired fasting glucose  Screen with a1c  - Hemoglobin A1C; Future    3. Mixed hyperlipidemia  On statin continue    4. Hypothyroidism, unspecified type  Euthyroid conitue LT4 dose  - TSH; Future    5. Non-seasonal allergic rhinitis due to other allergic trigger  Nasal steroid spray BID  - fluticasone propionate (FLONASE) 50 mcg/actuation nasal spray; 1 spray (50 mcg total) by Each Nostril route 2 (two) times a day.  Dispense: 48 g; Refill: 3

## 2024-03-15 LAB
ESTIMATED AVG GLUCOSE: 114 MG/DL (ref 68–131)
HBA1C MFR BLD: 5.6 % (ref 4–5.6)

## 2024-04-16 ENCOUNTER — TELEPHONE (OUTPATIENT)
Dept: FAMILY MEDICINE | Facility: CLINIC | Age: 84
End: 2024-04-16
Payer: MEDICARE

## 2024-04-16 NOTE — TELEPHONE ENCOUNTER
----- Message from Pelon Rajeev sent at 4/16/2024  1:13 PM CDT -----  Regarding: self  Type: Patient Call Back    Who called:self    What is the request in detail:pt is calling to get a paper that states elevator because her house is getting raised. She can't walk up the steps     Can the clinic reply by [facility]?no    Would the patient rather a call back or a response via [facility]? callback    Best call back number:[phone]    Additional Information:

## 2024-04-16 NOTE — TELEPHONE ENCOUNTER
Patient's daughter stated that her home is being elevated 10-15 feet higher and due to patient's being physically disabled, the company is requesting she has a doctor's letter stating that the elevator is required due to her physical health

## 2024-05-08 DIAGNOSIS — E55.9 HYPOVITAMINOSIS D: ICD-10-CM

## 2024-05-08 RX ORDER — ERGOCALCIFEROL 1.25 MG/1
50000 CAPSULE ORAL
Qty: 12 CAPSULE | Refills: 3 | Status: SHIPPED | OUTPATIENT
Start: 2024-05-08

## 2024-05-08 NOTE — TELEPHONE ENCOUNTER
Care Due:                  Date            Visit Type   Department     Provider  --------------------------------------------------------------------------------                                [facility]                              PRIMARY      MEDICINE/  Last Visit: 03-      CARE ([facility])   INTERNAL MED   Sherif Steen                              [facility]                              PRIMARY      MEDICINE/  Next Visit: 09-      CARE ([facility])   INTERNAL MED   Sherif Steen                                                            Last  Test          Frequency    Reason                     Performed    Due Date  --------------------------------------------------------------------------------    CBC.........  12 months..  diclofenac...............  08- 08-    Vitamin D...  12 months..  ergocalciferol...........  Not Found    Overdue    [employer] Embedded Care Due Messages. Reference number: [id number].   5/08/2024 8:02:15 AM CDT

## 2024-05-08 NOTE — TELEPHONE ENCOUNTER
Refill Routing Note   Medication(s) are not appropriate for processing by [facility] for the following reason(s):        Outside of protocol    [facility] action(s):  Route   Requires labs : Yes               Appointments  past 12m or future 3m with PCP    Date Provider   Last Visit   3/14/2024 Sherif Steen MD   Next Visit   9/17/2024 Sherif Steen MD   ED visits in past 90 days: 0        Note composed:4:57 PM 05/08/2024

## 2024-05-14 DIAGNOSIS — I10 ESSENTIAL HYPERTENSION: ICD-10-CM

## 2024-05-14 RX ORDER — METOPROLOL SUCCINATE 50 MG/1
TABLET, EXTENDED RELEASE ORAL
Qty: 90 TABLET | Refills: 3 | Status: SHIPPED | OUTPATIENT
Start: 2024-05-14

## 2024-05-14 NOTE — TELEPHONE ENCOUNTER
No care due was identified.  [facility] Embedded Care Due Messages. Reference number: [id number].   5/14/2024 8:03:47 AM CDT

## 2024-05-14 NOTE — TELEPHONE ENCOUNTER
Luann Whitaker  is requesting a refill authorization.  Brief Assessment and Rationale for Refill:  Approve     Medication Therapy Plan:         Comments:     Note composed:10:24 AM 05/14/2024

## 2024-05-16 ENCOUNTER — OFFICE VISIT (OUTPATIENT)
Dept: PSYCHIATRY | Facility: CLINIC | Age: 84
End: 2024-05-16
Payer: MEDICARE

## 2024-05-16 VITALS
DIASTOLIC BLOOD PRESSURE: 75 MMHG | WEIGHT: 198.44 LBS | OXYGEN SATURATION: 95 % | BODY MASS INDEX: 32.52 KG/M2 | HEART RATE: 61 BPM | SYSTOLIC BLOOD PRESSURE: 151 MMHG

## 2024-05-16 DIAGNOSIS — F41.1 GAD (GENERALIZED ANXIETY DISORDER): Primary | ICD-10-CM

## 2024-05-16 DIAGNOSIS — F33.0 MDD (MAJOR DEPRESSIVE DISORDER), RECURRENT EPISODE, MILD: ICD-10-CM

## 2024-05-16 DIAGNOSIS — I69.911 MEMORY DEFICIT AFTER CEREBROVASCULAR DISEASE: ICD-10-CM

## 2024-05-16 PROCEDURE — 99215 OFFICE O/P EST HI 40 MIN: CPT | Mod: HCNC,S$GLB,,

## 2024-05-16 PROCEDURE — 1160F RVW MEDS BY RX/DR IN RCRD: CPT | Mod: HCNC,CPTII,S$GLB,

## 2024-05-16 PROCEDURE — 99999 PR PBB SHADOW E&M-EST. PATIENT-LVL IV: CPT | Mod: PBBFAC,HCNC,,

## 2024-05-16 PROCEDURE — 1159F MED LIST DOCD IN RCRD: CPT | Mod: HCNC,CPTII,S$GLB,

## 2024-05-16 PROCEDURE — 3077F SYST BP >= 140 MM HG: CPT | Mod: HCNC,CPTII,S$GLB,

## 2024-05-16 PROCEDURE — 3078F DIAST BP <80 MM HG: CPT | Mod: HCNC,CPTII,S$GLB,

## 2024-05-16 PROCEDURE — 1125F AMNT PAIN NOTED PAIN PRSNT: CPT | Mod: HCNC,CPTII,S$GLB,

## 2024-05-16 RX ORDER — SERTRALINE HYDROCHLORIDE 100 MG/1
100 TABLET, FILM COATED ORAL DAILY
Qty: 90 TABLET | Refills: 1 | Status: SHIPPED | OUTPATIENT
Start: 2024-05-16 | End: 2024-11-12

## 2024-05-16 RX ORDER — QUETIAPINE FUMARATE 25 MG/1
25-50 TABLET, FILM COATED ORAL NIGHTLY
Qty: 60 TABLET | Refills: 3 | Status: SHIPPED | OUTPATIENT
Start: 2024-05-16 | End: 2024-09-13

## 2024-05-16 RX ORDER — TRAZODONE HYDROCHLORIDE 150 MG/1
150 TABLET ORAL NIGHTLY
Qty: 90 TABLET | Refills: 1 | Status: SHIPPED | OUTPATIENT
Start: 2024-05-16 | End: 2024-11-12
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
## 2024-05-30 ENCOUNTER — TELEPHONE (OUTPATIENT)
Dept: FAMILY MEDICINE | Facility: CLINIC | Age: 84
End: 2024-05-30
Payer: MEDICARE

## 2024-05-30 NOTE — LETTER
May 30, 2024    Luann Whitaker  [address] [place] [address]             [facility]  [address]  [facility] [address]  Phone: [phone] To Whom It May Concern:    Ms. Luann Whitaker (: 1940) has chronic medical conditions that limit her mobility especially ability to walk up stairs. Please allow accommodations for her home such that she has method of entry and egress without use of stairs.      Please do not hesitate to contact our clinic should you have any questions regarding this matter.     Very Respectfully           Sherif Steen M.D.

## 2024-05-30 NOTE — TELEPHONE ENCOUNTER
----- Message from Lorie Spears MA sent at 5/30/2024  2:33 PM CDT -----  Spoke to pt's daughter and a addendum is needed to the letter. She need's an address change to  [address] [place] [id number] and pt's full name added  Luann Whiteside Delaney Whitaker. Pt's daughter has been advised to give you 72 business hours to respond.  ----- Message -----  From: Xiomy Walker  Sent: 5/30/2024  10:13 AM CDT  To: Celsa Gorman Staff    Who called: Baylee villatoro       What is the request in detail: pt daughter would like updated copy of letter for elevator and would like to pick-up at office/ would like call back once letter is ready, pt daughter stated that pt had letter filed out back in April        Can the clinic reply by [facility]? No        Would the patient rather a call back or a response via [facility]? [facility] back        Best call back number:[phone]

## 2024-05-30 NOTE — TELEPHONE ENCOUNTER
----- Message from Xiomy Walker sent at 5/30/2024 10:10 AM CDT -----  Who called: Baylee daughter       What is the request in detail: pt daughter would like updated copy of letter for elevator and would like to pick-up at office/ would like call back once letter is ready, pt daughter stated that pt had letter filed out back in April        Can the clinic reply by [facility]? No        Would the patient rather a call back or a response via [facility]? [facility]        Best call back number:[phone]

## 2024-06-19 ENCOUNTER — OFFICE VISIT (OUTPATIENT)
Dept: NEUROLOGY | Facility: CLINIC | Age: 84
End: 2024-06-19
Payer: MEDICARE

## 2024-06-19 VITALS
HEART RATE: 63 BPM | WEIGHT: 201.94 LBS | BODY MASS INDEX: 33.65 KG/M2 | RESPIRATION RATE: 16 BRPM | HEIGHT: 65 IN | SYSTOLIC BLOOD PRESSURE: 140 MMHG | TEMPERATURE: 98 F | DIASTOLIC BLOOD PRESSURE: 67 MMHG

## 2024-06-19 DIAGNOSIS — F02.818: ICD-10-CM

## 2024-06-19 DIAGNOSIS — G47.01 INSOMNIA DUE TO MEDICAL CONDITION: ICD-10-CM

## 2024-06-19 DIAGNOSIS — G50.0 TRIGEMINAL NEURALGIA: Primary | ICD-10-CM

## 2024-06-19 PROCEDURE — 1159F MED LIST DOCD IN RCRD: CPT | Mod: HCNC,CPTII,S$GLB,

## 2024-06-19 PROCEDURE — 3288F FALL RISK ASSESSMENT DOCD: CPT | Mod: HCNC,CPTII,S$GLB,

## 2024-06-19 PROCEDURE — 99999 PR PBB SHADOW E&M-EST. PATIENT-LVL V: CPT | Mod: PBBFAC,HCNC,,

## 2024-06-19 PROCEDURE — 1125F AMNT PAIN NOTED PAIN PRSNT: CPT | Mod: HCNC,CPTII,S$GLB,

## 2024-06-19 PROCEDURE — 99215 OFFICE O/P EST HI 40 MIN: CPT | Mod: HCNC,S$GLB,,

## 2024-06-19 PROCEDURE — 3077F SYST BP >= 140 MM HG: CPT | Mod: HCNC,CPTII,S$GLB,

## 2024-06-19 PROCEDURE — 1101F PT FALLS ASSESS-DOCD LE1/YR: CPT | Mod: HCNC,CPTII,S$GLB,

## 2024-06-19 PROCEDURE — 3078F DIAST BP <80 MM HG: CPT | Mod: HCNC,CPTII,S$GLB,

## 2024-06-19 NOTE — PROGRESS NOTES
[facility] VISIT    Chief Complaint and Duration     Chief Complaint   Patient presents with    Neurologic Problem    for 4 years.    History of Present Illness     Luann Whitaker is a 83 y.o. right handed female with a history of multiple medical diagnoses as listed below that presents for trigeminal neuralgia pain.     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  Patient is currently being managed by Psychiatry for dementia with visual hallucinations.    Patient reports a chronic history a trigeminal neuralgia with onset in 2021 after a dental procedure (root canal).   Progression since onset initially waxed and waned patient had moderate relief on last year but reports beginning of this year progressively started to come back in within the last 2 months symptoms have become worse.  Location is right face jaw ear.  Patient reports sensations paresthesias as numbness, tingling, burning, stabbing, abnormal sensation of fire in face.  Patient reports pain scale 6-10/10.  Frequency of symptoms are daily duration of spells are 20 minutes then self resolves.  Patient also has a associated symptom of ear pain she denies any muscle spasm or twitching in the face.  She does report hypersensitivity to touch over jaw and ear area.  Symptoms are also provoked/aggravated by brushing teeth, tongue and gums, chewing denies any provocation of symptoms with swallowing or coughing.  Patient has multiple types of pharmacological treatments completed over the last 4 years with failure of treatment due to side effect profile and medication not working anymore.  Patient has not  tried any non pharmacological treatments at this time.  Patient reports if she rests her face she can get abating of symptoms.  At this current moment patient has no treatment plan of relief besides Tylenol she takes this 1000 mg twice a day with mild benefit.  Patient does endorse nighttime symptoms with disturbed sleep pattern.  Patient denies any significant history of diabetes, vascular insufficiency, strokes, cardiac surgery, neurologic disease, head trauma.  No recent viral or bacterial illnesses.    Treatments tried in the past includes carbamazepine, Lyrica, gabapentin, opioids, Valium, Tylenol.  Patient is currently being managed on Seroquel for dementia with visual hallucinations recently discontinued risperidone and doxepin.  Patient is being closely followed by primary care provider and Psychiatry.    Review of medical records and prior documentation  Past medical records were reviewed with data pertinent to the chief complaint summarized in the HPI. Information obtained from review of medical records is attributed to respective sources in the HPI with reference to sources of information at their mention. Records reviewed included all recent notes from referring provider, primary care, and related subspecialty evaluations as available. This review of records was performed and additional data obtained to supplement history obtained from the patient and further inform medical decision making involved in formulating a plan of care accounting for all history and treatment relevant to the issues addressed.    Review of patient's allergies indicates:   Allergen Reactions    Codeine Nausea And Vomiting    Iodinated contrast media Itching    Penicillins Itching    Talwin [pentazocine lactate] Nausea And Vomiting     Current Outpatient Medications   Medication Sig Dispense Refill    ACCU-CHEK SOFTCLIX LANCETS Misc TEST EVERY DAY AS NEEDED 100 each 0    acetaminophen (TYLENOL) 500 MG tablet Take 2 tablets (1,000  mg total) by mouth 2 (two) times daily as needed for Pain. 40 tablet 0    alcohol swabs PadM Apply 1 each topically daily as needed. 100 each 0    amLODIPine (NORVASC) 10 MG tablet Take 1 tablet (10 mg total) by mouth once daily. 90 tablet 3    ammonium lactate 12 % Crea Apply 2 g topically once daily. 140 g 1    aspirin (ECOTRIN) 81 MG EC tablet Take 81 mg by mouth once daily.      atorvastatin (LIPITOR) 40 MG tablet Take 1 tablet (40 mg total) by mouth once daily. 90 tablet 3    atropine 1% (ISOPTO ATROPINE) 1 % Drop Place 1 drop into the left eye 4 (four) times daily.      blood-glucose meter (TRUE METRIX AIR GLUCOSE METER) kit Use as directed, to use with insurance preferred meter 1 each 0    desmopressin (DDAVP NASAL) 10 mcg/spray (0.1 mL) SprP INHALE 1 SPRAY IN EACH NOSTRIL ONCE DAILY. 5 mL 2    diaper,brief,adult,disposable Misc       diclofenac sodium (VOLTAREN) 1 % Gel Apply 2 g topically 2 (two) times daily. 100 g 1    ergocalciferol (ERGOCALCIFEROL) 50,000 unit Cap TAKE ONE CAPSULE BY MOUTH EVERY 7 DAYS 12 capsule 3    fluticasone propionate (FLONASE) 50 mcg/actuation nasal spray 1 spray (50 mcg total) by Each Nostril route 2 (two) times a day. 48 g 3    furosemide (LASIX) 20 MG tablet Take 1 tablet (20 mg total) by mouth as needed (1 pill as needed for fluid overload). 90 tablet 3    ipratropium (ATROVENT) 21 mcg (0.03 %) nasal spray 2 sprays by Each Nostril route 2 (two) times daily. 30 mL 1    ketorolac 0.5% (ACULAR) 0.5 % Drop Place 1 drop into the left eye 4 (four) times daily.      levothyroxine (SYNTHROID) 75 MCG tablet TAKE ONE TABLET BY MOUTH EVERY MORNING ONE HOUR BEFORE BREAKFAST & other medications FOR THYROID 90 tablet 1    metoprolol succinate (TOPROL-XL) 50 MG 24 hr tablet TAKE ONE TABLET BY MOUTH ONCE DAILY (BLOOD PRESSURE) 90 tablet 3    nitroGLYCERIN (NITRO-TIME) 6.5 MG CpSR Take 1 capsule (6.5 mg total) by mouth 2 (two) times daily. 180 capsule 2    prednisoLONE acetate (PRED FORTE) 1 %  DrpS Place 1 drop into the left eye 4 (four) times daily.      pregabalin (LYRICA) 50 MG capsule Take 1 capsule (50 mg total) by mouth every evening. For nerve pain 90 capsule 1    QUEtiapine (SEROQUEL) 25 MG Tab Take 1-2 tablets (25-50 mg total) by mouth nightly. 60 tablet 3    sertraline (ZOLOFT) 100 MG tablet Take 1 tablet (100 mg total) by mouth once daily. 90 tablet 1    traZODone (DESYREL) 150 MG tablet Take 1 tablet (150 mg total) by mouth every evening. 90 tablet 1    ciprofloxacin HCl (CILOXAN) 0.3 % ophthalmic solution Place 1 drop into the left ear 4 (four) times daily. (Patient not taking: Reported on 6/19/2024)       No current facility-administered medications for this visit.       Medical History     Past Medical History:   Diagnosis Date    Angina pectoris     Combined forms of age-related cataract of left eye 8/18/2023    Coronary artery disease     Diabetes mellitus     High cholesterol     Hypertension     Hypothyroidism     Obesity     Recurrent depression 9/18/2023    Thyroid disease     Trouble in sleeping     Type 2 diabetes mellitus      Past Surgical History:   Procedure Laterality Date    BRAIN SURGERY      CARDIAC SURGERY      stents    CORONARY STENT PLACEMENT       Family History   Problem Relation Name Age of Onset    Stroke Mother      Hypertension Mother      Cancer Mother      Heart disease Father      Cancer Brother      Glaucoma Sister      No Known Problems Maternal Aunt      No Known Problems Maternal Uncle      No Known Problems Paternal Aunt      No Known Problems Paternal Uncle      No Known Problems Maternal Grandmother      No Known Problems Maternal Grandfather      No Known Problems Paternal Grandmother      No Known Problems Paternal Grandfather      Amblyopia Neg Hx      Blindness Neg Hx      Cataracts Neg Hx      Diabetes Neg Hx      Macular degeneration Neg Hx      Retinal detachment Neg Hx      Strabismus Neg Hx      Thyroid disease Neg Hx       Social History      Socioeconomic History    Marital status:    Tobacco Use    Smoking status: Former     Types: Cigarettes    Smokeless tobacco: Never   Substance and Sexual Activity    Alcohol use: No    Drug use: No     Social Determinants of Health     Financial Resource Strain: Patient Declined (6/18/2024)    Overall Financial Resource Strain (CARDIA)     Difficulty of Paying Living Expenses: Patient declined   Food Insecurity: No Food Insecurity (6/18/2024)    Hunger Vital Sign     Worried About Running Out of Food in the Last Year: Never true     Ran Out of Food in the Last Year: Never true   Transportation Needs: No Transportation Needs (9/14/2023)    PRAPARE - Transportation     Lack of Transportation (Medical): No     Lack of Transportation (Non-Medical): No   Physical Activity: Inactive (6/18/2024)    Exercise Vital Sign     Days of Exercise per Week: 0 days     Minutes of Exercise per Session: 0 min   Stress: Stress Concern Present (6/18/2024)    [employer] - Occupational Stress Questionnaire     Feeling of Stress : To some extent   Housing Stability: Low Risk  (9/14/2023)    Housing Stability Vital Sign     Unable to Pay for Housing in the Last Year: No     Number of Places Lived in the Last Year: 1     Unstable Housing in the Last Year: No       Exam     Vitals:    06/19/24 1113   BP: (!) 140/67   Pulse: 63   Resp: 16   Temp: 97.9 °F (36.6 °C)      Physical Exam:  General: Not in acute distress. Not ill-appearing.   HENT: Normocephalic and atraumatic. Moist mucous membranes.  Eyes: Conjunctivae normal.   Pulmonary: Pulmonary effort is normal.   Abdominal: Abdomen is soft and flat.   Skin: Skin is warm and dry. No rashes.   Psychiatric: Mood normal.        Neurologic Exam   Mental status: oriented to person, place, and time  Attention: Normal. Concentration: normal.  Speech: speech is normal.  Cranial Nerves: PERRL, EOMI intact, V1-V3 Facial sensation intact. Symmetric facies.  Hearing grossly intact. Palate and uvula midline, symmetric. No tongue deviation. Trapezius strength intact.     Motor exam: bulk and tone normal. Strength 5/5 in bilateral upper extremities: deltoids, biceps, triceps, wrist flexion/extension, finger abduction/adduction. Strength 5/5 in bilateral lower extremities: hip flexion/extension, thigh adduction/abduction, knee flexion/extension, dorsiflexion/plantarflexion, foot eversion/inversion.    Reflexes: 2+ in bilateral upper extremities: biceps and brachiaradialis, 2+ in bilateral lower extremities: patellar and achilles  Plantar reflex: normal  Smith's/Clonus: negative    Sensory exam: pin prick and light touch intact, hypersensitivity over right jawline face ear.    Gait exam:  Wide-based slow cautious with Rollator  Romberg: negative  Coordination: normal    Tremor: none  Cogwheel rigidity: none    Labs and Imaging     Labs: reviewed  No results found for this or any previous visit (from the past 24 hour(s)).    Thyroid normal  HgA1C%:  5.6  LDL:  98.2    Imaging:   I have personally reviewed the images performed.     BRAIN MRI 11/28/21  Impression:  No acute process seen.  Minimal involutional change and small vessel ischemic change.  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 Baltazar is a 83-year-old female new to me who presents to clinic for evaluation and recommendations in regards to chronic trigeminal neuralgia (refractory).  Patient reports root canal September 20, 2020 with persistent right mandibular pain she has been seen by previous neurologist was tried multiple medications for jaw pain.  Over the last 4 years patient reports on last year  symptoms had improved but within the last 2 months symptoms have return and are now scored 6-7 on a level of severity.  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  Patient was not able to continue medications for trigeminal neuralgia due to side effect profile as it was causing her to be confused, have dizzy spells, and falling.  Therefore patient has been managed on Tylenol by her primary care provider. Patient reports sensations paresthesias as numbness, tingling, burning, stabbing, abnormal sensation of fire in face.  Patient reports pain scale 6-10/10.  Frequency of symptoms are daily duration of spells are 20 minutes then self resolves.  Patient also has a associated symptom of ear pain she denies any muscle spasm or twitching in the face.  She does report hypersensitivity to touch over jaw and ear area.  Symptoms are also provoked/aggravated by brushing teeth, tongue and gums, chewing denies any provocation of symptoms with swallowing or coughing.  Patient has multiple types of pharmacological treatments completed over the last 4 years with failure of treatment due to side effect profile and medication not working anymore.  Patient has not tried any non pharmacological treatments at this time.  Physical examination is unremarkable patient does have positive hyper sensation to right cheek, jaw, ear upon palpation.  Patient denies any other focal neurologic symptoms other than ones listed above at this time.    Given patient's longstanding history of trigeminal neuralgia with failure of pharmacological treatment plans and change in mentation over the last couple of years my plan is as follows.  Discussed with patient the need for potential treatments that are not sedating and affecting of her central nervous system as they have in the past.  Recommended  patient continue to take Tylenol for pain and maximize conservative management to help mitigate symptoms.  Recommended patient apply over-the-counter topical lidocaine to areas of concern only directly to skin not for oral use.  In addition discussed with the patient next course of action after pharmacological failure.  I have place ambulatory referral to Neurosurgery for evaluation and recommendations in regards to gamma knife procedure.  Otherwise educated patient on lifestyle modifications and symptoms requiring activation of the EMS system and reporting to the emergency room.    Continue home medications/regime and follow up with PCP for surveillance and long-term management.    In regards to dementia with behavioral disturbance patient was continued to be closely followed and monitored by psychiatry with medication management and other treatment modalities.      Questions and concerns were sought and answered to the patient's stated verbal satisfaction. The patient verbalizes understanding and agreement with the above stated treatment plan.      Visit today included increased complexity associated with the care of the episodic problem trigeminal neuralgia addressed and managing the longitudinal care of the patient due to the serious and/or complex managed problem(s) vascular and neurological risk factor.    CAMILLA Viveros  [facility]  Department of Neurology- [facility]     [phone]    Follow-up:  With Neurosurgery     Time spent on this encounter: 45 minutes. This includes face to face time and non-face to face time preparing to see the patient (eg, review of tests), obtaining and/or reviewing separately obtained history, documenting clinical information in the electronic or other health record, independently interpreting results and communicating results to the patient/family/caregiver, or care coordinator.     This note was created by combination of typed  and  M-Modal dictation. Transcription and phonetic errors may be present.  If there are any questions, please contact me.

## 2024-07-09 ENCOUNTER — TELEPHONE (OUTPATIENT)
Dept: NEUROSURGERY | Facility: CLINIC | Age: 84
End: 2024-07-09
Payer: MEDICARE

## 2024-07-09 DIAGNOSIS — R51.9 INTRACTABLE HEADACHE, UNSPECIFIED CHRONICITY PATTERN, UNSPECIFIED HEADACHE TYPE: Primary | ICD-10-CM

## 2024-07-24 ENCOUNTER — HOSPITAL ENCOUNTER (OUTPATIENT)
Dept: RADIOLOGY | Facility: HOSPITAL | Age: 84
Discharge: HOME | End: 2024-07-24
Attending: NEUROLOGICAL SURGERY
Payer: MEDICARE

## 2024-07-24 DIAGNOSIS — R51.9 INTRACTABLE HEADACHE, UNSPECIFIED CHRONICITY PATTERN, UNSPECIFIED HEADACHE TYPE: ICD-10-CM

## 2024-07-24 PROCEDURE — 70553 MRI BRAIN STEM W/O & W/DYE: CPT | Mod: TC

## 2024-07-24 PROCEDURE — 70553 MRI BRAIN STEM W/O & W/DYE: CPT | Mod: 26,,, | Performed by: INTERNAL MEDICINE

## 2024-07-24 PROCEDURE — A9585 GADOBUTROL INJECTION: HCPCS | Performed by: NEUROLOGICAL SURGERY

## 2024-07-24 PROCEDURE — 25500020 PHARM REV CODE 255: Performed by: NEUROLOGICAL SURGERY

## 2024-07-24 RX ORDER — GADOBUTROL 604.72 MG/ML
9 INJECTION INTRAVENOUS
Status: COMPLETED | OUTPATIENT
Start: 2024-07-24 | End: 2024-07-24

## 2024-07-24 RX ADMIN — GADOBUTROL 9 ML: 604.72 INJECTION INTRAVENOUS at 01:07

## 2024-07-31 ENCOUNTER — OFFICE VISIT (OUTPATIENT)
Dept: NEUROSURGERY | Facility: CLINIC | Age: 84
End: 2024-07-31
Payer: MEDICARE

## 2024-07-31 VITALS
WEIGHT: 204.56 LBS | BODY MASS INDEX: 34.08 KG/M2 | HEIGHT: 65 IN | SYSTOLIC BLOOD PRESSURE: 159 MMHG | HEART RATE: 71 BPM | OXYGEN SATURATION: 95 % | DIASTOLIC BLOOD PRESSURE: 66 MMHG

## 2024-07-31 DIAGNOSIS — G50.0 TRIGEMINAL NEURALGIA: Primary | ICD-10-CM

## 2024-07-31 DIAGNOSIS — G50.0 TRIGEMINAL NEURALGIA: ICD-10-CM

## 2024-07-31 PROCEDURE — 99204 OFFICE O/P NEW MOD 45 MIN: CPT | Mod: S$GLB,,, | Performed by: NEUROLOGICAL SURGERY

## 2024-07-31 PROCEDURE — 1160F RVW MEDS BY RX/DR IN RCRD: CPT | Mod: CPTII,S$GLB,, | Performed by: NEUROLOGICAL SURGERY

## 2024-07-31 PROCEDURE — 3288F FALL RISK ASSESSMENT DOCD: CPT | Mod: CPTII,S$GLB,, | Performed by: NEUROLOGICAL SURGERY

## 2024-07-31 PROCEDURE — 1101F PT FALLS ASSESS-DOCD LE1/YR: CPT | Mod: CPTII,S$GLB,, | Performed by: NEUROLOGICAL SURGERY

## 2024-07-31 PROCEDURE — 1159F MED LIST DOCD IN RCRD: CPT | Mod: CPTII,S$GLB,, | Performed by: NEUROLOGICAL SURGERY

## 2024-07-31 PROCEDURE — 1125F AMNT PAIN NOTED PAIN PRSNT: CPT | Mod: CPTII,S$GLB,, | Performed by: NEUROLOGICAL SURGERY

## 2024-07-31 PROCEDURE — 3078F DIAST BP <80 MM HG: CPT | Mod: CPTII,S$GLB,, | Performed by: NEUROLOGICAL SURGERY

## 2024-07-31 PROCEDURE — 3077F SYST BP >= 140 MM HG: CPT | Mod: CPTII,S$GLB,, | Performed by: NEUROLOGICAL SURGERY

## 2024-07-31 NOTE — PATIENT INSTRUCTIONS
I have personally reviewed the MRI BRAIN W WO CONTRAST with the pt which shows no finding to correlate with the clinical history. Stable focus of abnormal signal consistent with cavernous malformation. Interval remote infarcts in the occipital lobes bilaterally and the left lentiform nucleus corona radiata    I recommend gamma knife (focused radiation) for R sided TN.I have discussed the risks/benefits, indications, and alternatives for the proposed procedure in detail. I have answered all of their questions and patient wish to proceed with radiosurgery. We will schedule patient.    I will discuss with my partner, Dr. DEBORA Caballero (rad oncologist) for co-treatment planning.

## 2024-07-31 NOTE — PROGRESS NOTES
Subjective:   I, Caesar Rodgers , attest that this documentation has been prepared under the direction and in the presence of Greg Ontiveros MD.     Patient ID: Luann Whitaker is a 83 y.o. female     Chief Complaint: Back Pain      Back Pain  Associated symptoms include numbness. Pertinent negatives include no fever, headaches or weakness.     Ms. Luann Whitaker is a 83 y.o. woman w/ h/o carpal tunnel syndrome (left), chronic TN that is refractory to medications, chronic lumbar radiculopathy, HTN, HLD, and CAD presents to the NSGY clinic today to establish care.  This was referred to me by Neurology to discuss possible neurosurgical intervention in treating the pts chronic refractory TN. The pt believes the pain initially started since her root canal from two years ago.  She was then was referred to neurology from the dentist who performed the root canal.  This is a pt who has been suffering from TN over the last 4 years intermittently with varying severity of symptoms, but within the last 2 months, her symptoms have progressively worsened to a severity of 6-10.  Her pain was described as a burning sensation accompanied by stabbing and paresthesias on the face.  She states that the pain is worsened by hot and cold liquid and brushing her teeth. She experiences these symptoms daily of late likely due to the intolerable side effects of her medications causing dizziness and falls.  She states that the Lyrica provides little to no relief for her TN.     Review of Systems   Constitutional:  Negative for activity change, appetite change, fatigue, fever and unexpected weight change.   HENT:  Negative for facial swelling.         Facial pain TN   Eyes: Negative.    Respiratory: Negative.     Cardiovascular: Negative.    Gastrointestinal:  Negative for diarrhea, nausea and vomiting.   Endocrine: Negative.    Genitourinary: Negative.    Musculoskeletal:  Negative for back pain, joint swelling,  myalgias and neck pain.   Neurological:  Positive for numbness. Negative for dizziness, seizures, weakness and headaches.   Psychiatric/Behavioral: Negative.          Past Medical History:   Diagnosis Date    Angina pectoris     Combined forms of age-related cataract of left eye 8/18/2023    Coronary artery disease     Diabetes mellitus     High cholesterol     Hypertension     Hypothyroidism     Obesity     Recurrent depression 9/18/2023    Thyroid disease     Trouble in sleeping     Type 2 diabetes mellitus        Objective:      Vitals:    07/31/24 1235   BP: (!) 159/66   Pulse: 71      Physical Exam  Constitutional:       General: She is not in acute distress.     Appearance: Normal appearance.   HENT:      Head: Normocephalic and atraumatic.   Pulmonary:      Effort: Pulmonary effort is normal.   Musculoskeletal:      Cervical back: Neck supple.   Neurological:      Mental Status: She is alert and oriented to person, place, and time.      GCS: GCS eye subscore is 4. GCS verbal subscore is 5. GCS motor subscore is 6.      Cranial Nerves: No cranial nerve deficit.       IMAGING:  MRI BRAIN W WO CONTRAST 7/24/24:  No finding to correlate with the clinical history. Stable focus of abnormal signal consistent with cavernous malformation. Interval remote infarcts in the occipital lobes bilaterally and the left lentiform nucleus corona radiata    I have personally reviewed the images with the pt.      I, Dr. Greg Ontiveros, personally performed the services described in this documentation. All medical record entries made by the scribe, Caesar Rodgers, were at my direction and in my presence.  I have reviewed the chart and agree that the record reflects my personal performance and is accurate and complete. Greg Ontiveros MD. 07/31/2024    Assessment:       1. Trigeminal neuralgia         Plan:   I have personally reviewed the MRI BRAIN W WO CONTRAST with the pt which shows no finding to correlate with the clinical history.  Stable focus of abnormal signal consistent with cavernous malformation. Interval remote infarcts in the occipital lobes bilaterally and the left lentiform nucleus corona radiata    I recommend gamma knife (focused radiation) for R sided TN.I have discussed the risks/benefits, indications, and alternatives for the proposed procedure in detail. I have answered all of their questions and patient wish to proceed with radiosurgery. We will schedule patient.    I will discuss with my partner, Dr. DEBORA Caballero (rad oncologist) for co-treatment planning.